# Patient Record
Sex: MALE | Race: WHITE | Employment: OTHER | URBAN - METROPOLITAN AREA
[De-identification: names, ages, dates, MRNs, and addresses within clinical notes are randomized per-mention and may not be internally consistent; named-entity substitution may affect disease eponyms.]

---

## 2021-06-16 ENCOUNTER — HOSPITAL ENCOUNTER (EMERGENCY)
Age: 66
Discharge: HOME | End: 2021-06-16
Attending: EMERGENCY MEDICINE
Payer: MEDICARE

## 2021-06-16 VITALS
RESPIRATION RATE: 18 BRPM | SYSTOLIC BLOOD PRESSURE: 152 MMHG | OXYGEN SATURATION: 99 % | DIASTOLIC BLOOD PRESSURE: 94 MMHG | BODY MASS INDEX: 30.48 KG/M2 | WEIGHT: 225 LBS | HEIGHT: 72 IN | TEMPERATURE: 97.8 F | HEART RATE: 84 BPM

## 2021-06-16 DIAGNOSIS — K08.409 S/P TOOTH EXTRACTION: ICD-10-CM

## 2021-06-16 DIAGNOSIS — K13.79 BLEEDING FROM MOUTH: Primary | ICD-10-CM

## 2021-06-16 PROCEDURE — 99282 EMERGENCY DEPT VISIT SF MDM: CPT

## 2021-06-16 PROCEDURE — 74011000250 HC RX REV CODE- 250: Performed by: PHYSICIAN ASSISTANT

## 2021-06-16 PROCEDURE — 2709999900 HC NON-CHARGEABLE SUPPLY

## 2021-06-16 RX ADMIN — WATER 10 ML: 1 INJECTION INTRAMUSCULAR; INTRAVENOUS; SUBCUTANEOUS at 21:08

## 2021-06-17 NOTE — DISCHARGE INSTRUCTIONS
Must be seen by your dentist as soon as possible in the morning    Keep constant pressure on the area as we have discussed    Tylenol for pain.   650 mg every 4-6 hours    Avoid aspirin/anti-inflammatories    If you are that uncomfortable, I would advise that you travel down to [facility] to be seen otherwise see your dentist at [facility] tomorrow morning

## 2021-06-17 NOTE — ED TRIAGE NOTES
Patient arrives ambulatory to ED with c/c of dental problem. Patient had 2 teeth pulled earlier today and can not stop the bleeding. Patient very talkative in triage, patient states he has multiple allergies but does not know what he is allergic too, he takes multiple medications at home but does not know what they are either.

## 2021-06-17 NOTE — ED PROVIDER NOTES
EMERGENCY DEPARTMENT HISTORY AND PHYSICAL EXAM    Date: 2021  Patient Name: Oleksandr Dougherty    History of Presenting Illness     Chief Complaint   Patient presents with    Dental Problem       History Provided By: Patient and Patient's Wife    Additional History (Context):   Oleksandr Dougherty is a 77 y.o. male presents emergency room with complaints of bleeding from his mouth. Earlier today at [facility], patient had multiple teeth extracted from his mouth. According the patient, extraction was very traumatic as they had a very difficult time removing his teeth. Once removed, had increased bleeding. Patient was sent home on antibiotics and advised to hold pressure to the area of bleeding. He had been doing this for quite some time. Bleeding would not stop. Complains of mouth pain along the upper gumline with radiation up towards the left maxillary region. Patient denies being on any aspirin products or blood thinners. He has been taken ibuprofen for his pain. Teeth were extracted due to extensive decay. PCP: Jaison, MD Anand        Past History     Past Medical History:  Past Medical History:   Diagnosis Date    Gout     Hypertension        Past Surgical History:  Past Surgical History:   Procedure Laterality Date    HX CORONARY STENT PLACEMENT  2002    x 2        Family History:  History reviewed. No pertinent family history. Social History:  Social History     Tobacco Use    Smoking status: Former Smoker     Quit date: 2002     Years since quittin.0    Smokeless tobacco: Never Used   Substance Use Topics    Alcohol use: Not Currently    Drug use: Never       Allergies: Allergies   Allergen Reactions    Pcn [Penicillins] Unknown (comments)         Review of Systems   Review of Systems   Constitutional: Negative for chills and fever. HENT: Positive for dental problem. Negative for facial swelling.          Bleeding from mouth/gumline from dental extraction Respiratory: Negative. Cardiovascular: Negative. Gastrointestinal: Negative. Neurological: Positive for headaches. Physical Exam     Vitals:    06/16/21 2034   BP: (!) 152/94   Pulse: 84   Resp: 18   Temp: 97.8 °F (36.6 °C)   SpO2: 99%   Weight: 102.1 kg (225 lb)   Height: 6' (1.829 m)     Physical Exam  Vitals and nursing note reviewed. Constitutional:       Appearance: Normal appearance. He is well-developed, well-groomed and normal weight. He is not ill-appearing. Comments: 60-year-old male. No apparent distress. Vital signs show him to be mildly hypertensive otherwise stable. Cooperative. In the room with his wife. HENT:      Mouth/Throat:      Lips: Pink. Mouth: Mucous membranes are moist. No injury or lacerations. Dentition: Abnormal dentition. Dental tenderness present. No gingival swelling. Pharynx: Oropharynx is clear. Comments: Left upper gumline/maxillary region. 2 teeth obviously extracted. Left upper external incisor and canine tooth. Deep extraction sites. Oozing blood. There is also a large clot in the area that was removed. Holding pressure, noticed a slow ooze of blood coming from the area. There was no gum swelling or bruising. Neurological:      Mental Status: He is alert. Psychiatric:         Behavior: Behavior is cooperative. Nursing note and vitals reviewed         Diagnostic Study Results     Labs -   No results found for this or any previous visit (from the past 12 hour(s)). Radiologic Studies   No orders to display     CT Results  (Last 48 hours)    None        CXR Results  (Last 48 hours)    None            Medical Decision Making   I am the first provider for this patient. I reviewed the vital signs, available nursing notes, past medical history, past surgical history, family history and social history. Vital Signs-Reviewed the patient's vital signs.     Records Reviewed: Nursing Notes    DDX: Mouth bleeding secondary to multiple teeth extraction  Not on any blood thinners    Provider Notes:   77 y.o. male   Patient with slow oozing of blood coming from his mouth after having multiple teeth extracted today. It sounds as though this was quite a traumatic extraction according to the patient. Evidently according the patient he was bleeding quite a bit before he left the dentist office. Here, the area was irrigated initially with normal saline. I then had the patient rinse out his mouth quite extensively with normal saline in an attempt to wash away some of the clots and get a better look at the bleeding. It appears that the bleeding is predominantly coming from the canine tooth region. Attempted to hold pressure which was unsuccessful. Attempted TXA to assist with clotting. This was initially done with 2 x 2's. Then attempted to soak Q-tip ends in TXA and placed into the individual holes of the extraction sites. This was done multiple times. Patient continued to bleed slow ooze. Patient was advised that he is probably getting need to be seen by the dentist tomorrow morning. The best we can do at this point would be to offer him to hold pressure to the areas hoping that eventually the bleeding will stop. Patient was advised that he can take Tylenol for pain. He was prescribed an antibiotic from the dentist.    Had patient wife attempted to call the dentist while they were here to advise of their visit here to the emergency room and to see if they can get close follow-up tomorrow morning. Patient was advised to avoid any aspirin or anti-inflammatories. Procedures:  Procedures    ED Course:   Initial assessment performed. The patients presenting problems have been discussed, and they are in agreement with the care plan formulated and outlined with them. I have encouraged them to ask questions as they arise throughout their visit.          ED Physician Discussion Note:     Diagnosis and Disposition DISCHARGE NOTE:  Wyatt Quintero  results have been reviewed with him. He has been counseled regarding his diagnosis, treatment, and plan. He verbally conveys understanding and agreement of the signs, symptoms, diagnosis, treatment and prognosis and additionally agrees to follow up as discussed. He also agrees with the care-plan and conveys that all of his questions have been answered. I have also provided discharge instructions for him that include: educational information regarding their diagnosis and treatment, and list of reasons why they would want to return to the ED prior to their follow-up appointment, should his condition change. He has been provided with education for proper emergency department utilization. CLINICAL IMPRESSION:    1. Bleeding from mouth    2. S/P tooth extraction        PLAN:  1. D/C Home  2. There are no discharge medications for this patient. 3.   Follow-up Information     Follow up With Specialties Details Why Contact Info    Dentist   You must contact your dentist to be seen by them tomorrow         ____________________________________     Please note that this dictation was completed with [facility], the computer voice recognition software. Quite often unanticipated grammatical, syntax, homophones, and other interpretive errors are inadvertently transcribed by the computer software. Please disregard these errors. Please excuse any errors that have escaped final proofreading.

## 2022-12-14 ENCOUNTER — HOSPITAL ENCOUNTER (EMERGENCY)
Age: 67
Discharge: HOME | End: 2022-12-14
Attending: PHYSICIAN ASSISTANT
Payer: MEDICARE

## 2022-12-14 ENCOUNTER — APPOINTMENT (OUTPATIENT)
Dept: GENERAL RADIOLOGY | Age: 67
End: 2022-12-14
Attending: PHYSICIAN ASSISTANT
Payer: MEDICARE

## 2022-12-14 VITALS
HEIGHT: 72 IN | WEIGHT: 215 LBS | OXYGEN SATURATION: 100 % | RESPIRATION RATE: 18 BRPM | DIASTOLIC BLOOD PRESSURE: 68 MMHG | HEART RATE: 74 BPM | SYSTOLIC BLOOD PRESSURE: 119 MMHG | TEMPERATURE: 96.9 F | BODY MASS INDEX: 29.12 KG/M2

## 2022-12-14 DIAGNOSIS — R05.2 SUBACUTE COUGH: Primary | ICD-10-CM

## 2022-12-14 PROCEDURE — 71046 X-RAY EXAM CHEST 2 VIEWS: CPT

## 2022-12-14 PROCEDURE — 99283 EMERGENCY DEPT VISIT LOW MDM: CPT

## 2022-12-14 NOTE — DISCHARGE INSTRUCTIONS
Your chest x-ray does not show any signs of infection or fluid, you should finish taking your antibiotics as prescribed, continue with increasing your clear liquids, avoid dairy, and return to the ER for any chest pain or shortness of breath or return of fever

## 2022-12-14 NOTE — ED TRIAGE NOTES
Pt arrive to ed with c/o cough. Pt was recently diagnosed with pneumonia 12/6/2022. Pt report that he was placed on ABX; Zithromax and Augmentin . Pt report that ABX causing diarrhea. PCP recommended to pt continue with ABX. Pt report no change in cough.

## 2022-12-14 NOTE — ED PROVIDER NOTES
EMERGENCY DEPARTMENT HISTORY AND PHYSICAL EXAM      Date: 2022  Patient Name: Soha Schmidt    History of Presenting Illness     Chief Complaint   Patient presents with    Cough       History Provided By: Patient and Patient's Wife    HPI: Soha Schmidt, 79 y.o. male with past medical history to include hypertension, ACS, and gout presents to the emergency department with ongoing cough and weakness. He was diagnosed with pneumonia 12 days ago, was put on Augmentin, azithromycin, and cough medication. Patient then reports that he was admitted to another inpatient facility for concern of abdominal aortic aneurysm, turned out to be chronic, but he was not taking his antibiotics at that time. He does report some diarrhea with antibiotic use. He feels like he is getting better however he is concerned because he did not take his antibiotics as he was supposed to. He denies chest pain, shortness of breath, fever, chills, body aches, vomiting. There are no other complaints, changes, or physical findings at this time. Past History     Past Medical History:  Past Medical History:   Diagnosis Date    Gout     Hypertension        Past Surgical History:  Past Surgical History:   Procedure Laterality Date    HX CORONARY STENT PLACEMENT  2002    x 2        Family History:  No family history on file. Social History:  Social History     Tobacco Use    Smoking status: Former     Types: Cigarettes     Quit date: 2002     Years since quittin.5    Smokeless tobacco: Never   Substance Use Topics    Alcohol use: Not Currently    Drug use: Never       Allergies: Allergies   Allergen Reactions    Pcn [Penicillins] Unknown (comments)       PCP: Jaison, MD Anand    No current facility-administered medications on file prior to encounter. No current outpatient medications on file prior to encounter.        Review of Systems   Review of Systems   Constitutional:  Negative for activity change, chills and fever. HENT:  Negative for congestion, ear pain, rhinorrhea and trouble swallowing. Eyes:  Negative for pain and visual disturbance. Respiratory:  Positive for cough. Negative for shortness of breath. Cardiovascular:  Negative for chest pain. Gastrointestinal:  Negative for abdominal pain, diarrhea, nausea and vomiting. Genitourinary:  Negative for decreased urine volume, difficulty urinating, dysuria and hematuria. Musculoskeletal:  Negative for arthralgias and myalgias. Skin:  Negative for rash. Neurological:  Positive for weakness. Negative for headaches. Hematological:  Negative for adenopathy. Psychiatric/Behavioral:  The patient is not nervous/anxious. All other systems reviewed and are negative. Physical Exam   Physical Exam  Vitals and nursing note reviewed. Constitutional:       General: He is not in acute distress. Appearance: He is well-developed. He is not ill-appearing or toxic-appearing. HENT:      Head: Normocephalic and atraumatic. Mouth/Throat:      Mouth: Mucous membranes are moist.   Eyes:      Extraocular Movements: Extraocular movements intact. Pupils: Pupils are equal, round, and reactive to light. Cardiovascular:      Rate and Rhythm: Normal rate and regular rhythm. Pulses: Normal pulses. Heart sounds: Normal heart sounds. No murmur heard. Pulmonary:      Effort: Pulmonary effort is normal. No tachypnea or respiratory distress. Breath sounds: Normal breath sounds. No wheezing, rhonchi or rales. Abdominal:      General: Abdomen is flat. Bowel sounds are normal.      Palpations: Abdomen is soft. Tenderness: There is no abdominal tenderness. Skin:     General: Skin is warm and dry. Capillary Refill: Capillary refill takes less than 2 seconds. Findings: No rash. Neurological:      General: No focal deficit present. Mental Status: He is alert. Cranial Nerves: No cranial nerve deficit.    Psychiatric: Mood and Affect: Mood normal.         Behavior: Behavior normal.       Lab and Diagnostic Study Results   Labs -   No results found for this or any previous visit (from the past 12 hour(s)). Radiologic Studies -   @lastxrresult@  CT Results  (Last 48 hours)      None          CXR Results  (Last 48 hours)                 12/14/22 1456  XR CHEST PA LAT Final result    Impression:          1. No acute radiographic cardiopulmonary abnormality. Narrative:  EXAM: XR CHEST PA LAT       CLINICAL INDICATION/HISTORY: hx of pneumonia, cough   -Additional: None       COMPARISON: None       TECHNIQUE: PA and lateral views of the chest       _______________       FINDINGS:       HEART AND MEDIASTINUM: Cardiac size and mediastinal contours are within normal   limits       LUNGS AND PLEURAL SPACES: No focal pneumonic consolidation, pneumothorax or   pleural effusion       BONY THORAX AND SOFT TISSUES: No acute osseous abnormality. Coarsened hepatic   calcifications present, likely granulomatous. _______________                   Medical Decision Making and ED Course   Differential Diagnosis & Medical Decision Making Provider Note:   80-year-old male with a remote history of pneumonia who is currently taking Augmentin, finished azithromycin, presenting for concern for worsening infection. He is well-appearing, his vitals are stable, his lung exam is normal however chest x-ray completed to ensure improvement/resolution of pneumonia process. He has no cardiac symptoms, his exam is otherwise normal, saturating 100% on room air. His chest x-ray reveals no evidence of acute process, advised to finish his Augmentin and continue benzonatate for cough. Advised close follow-up with PCP, he voices understanding.    - I am the first provider for this patient. I reviewed the vital signs, available nursing notes, past medical history, past surgical history, family history and social history.  The patients presenting problems have been discussed, and they are in agreement with the care plan formulated and outlined with them. I have encouraged them to ask questions as they arise throughout their visit. Vital Signs-Reviewed the patient's vital signs. Patient Vitals for the past 12 hrs:   Temp Pulse Resp BP SpO2   12/14/22 1357 96.9 °F (36.1 °C) 74 18 119/68 100 %       ED Course:        Procedures   Performed by: Maddi Jason PA-C  Procedures      Disposition   Disposition: DC- Adult Discharges: All of the diagnostic tests were reviewed and questions answered. Diagnosis, care plan and treatment options were discussed. The patient understands the instructions and will follow up as directed. The patients results have been reviewed with them. They have been counseled regarding their diagnosis. The patient verbally convey understanding and agreement of the signs, symptoms, diagnosis, treatment and prognosis and additionally agrees to follow up as recommended with their PCP in 24 - 48 hours. They also agree with the care-plan and convey that all of their questions have been answered. I have also put together some discharge instructions for them that include: 1) educational information regarding their diagnosis, 2) how to care for their diagnosis at home, as well a 3) list of reasons why they would want to return to the ED prior to their follow-up appointment, should their condition change. Discharged     DISCHARGE PLAN:  1. There are no discharge medications for this patient. 2.   Follow-up Information       Follow up With Specialties Details Why Contact Info    Brian Xavier MD Family Medicine Schedule an appointment as soon as possible for a visit  for follow up from ER visit [address] [facility] [address] [facility] [address] [place]      [facility] EMERGENCY DEPT Emergency Medicine  As needed, If symptoms worsen [address] [address]  [phone]          3.   Return to ED if worse 4. There are no discharge medications for this patient. Diagnosis/Clinical Impression     Clinical Impression:   1. Subacute cough        Attestations: Fernando LEMUS PA-C, am the primary clinician of record. Please note that this dictation was completed with [employer], the computer voice recognition software. Quite often unanticipated grammatical, syntax, homophones, and other interpretive errors are inadvertently transcribed by the computer software. Please disregard these errors. Please excuse any errors that have escaped final proofreading. Thank you.